# Patient Record
Sex: FEMALE | Race: OTHER | Employment: UNEMPLOYED | URBAN - NONMETROPOLITAN AREA
[De-identification: names, ages, dates, MRNs, and addresses within clinical notes are randomized per-mention and may not be internally consistent; named-entity substitution may affect disease eponyms.]

---
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
## 2025-01-10 ENCOUNTER — TELEPHONE (OUTPATIENT)
Dept: FAMILY MEDICINE CLINIC | Age: 20
End: 2025-01-10

## 2025-01-10 NOTE — TELEPHONE ENCOUNTER
Attempted to reach patient regarding missed appointment on 1/10/25. Unable to contact at this time. Unable to leave message. Letter mailed to reschedule.

## 2025-06-23 ENCOUNTER — OFFICE VISIT (OUTPATIENT)
Dept: PRIMARY CARE CLINIC | Age: 20
End: 2025-06-23
Payer: COMMERCIAL

## 2025-06-23 ENCOUNTER — HOSPITAL ENCOUNTER (OUTPATIENT)
Age: 20
Discharge: HOME | End: 2025-06-23
Payer: COMMERCIAL

## 2025-06-23 ENCOUNTER — RESULTS FOLLOW-UP (OUTPATIENT)
Dept: PRIMARY CARE CLINIC | Age: 20
End: 2025-06-23

## 2025-06-23 VITALS
OXYGEN SATURATION: 98 % | SYSTOLIC BLOOD PRESSURE: 90 MMHG | HEART RATE: 78 BPM | BODY MASS INDEX: 21 KG/M2 | WEIGHT: 126.2 LBS | DIASTOLIC BLOOD PRESSURE: 70 MMHG | TEMPERATURE: 98 F

## 2025-06-23 DIAGNOSIS — N89.8 VAGINAL DISCHARGE: ICD-10-CM

## 2025-06-23 DIAGNOSIS — N76.0 BACTERIAL VAGINOSIS: Primary | ICD-10-CM

## 2025-06-23 DIAGNOSIS — R35.0 FREQUENT URINATION: ICD-10-CM

## 2025-06-23 DIAGNOSIS — B96.89 BACTERIAL VAGINOSIS: Primary | ICD-10-CM

## 2025-06-23 LAB
BILIRUB UR QL STRIP: NEGATIVE
CANDIDA SPECIES: NEGATIVE
CLARITY UR: CLEAR
COLOR UR: YELLOW
COMMENT: ABNORMAL
GARDNERELLA VAGINALIS: POSITIVE
GLUCOSE UR STRIP-MCNC: NEGATIVE MG/DL
HCG UR QL: NEGATIVE
HGB UR QL STRIP.AUTO: NEGATIVE
KETONES UR STRIP-MCNC: NEGATIVE MG/DL
LEUKOCYTE ESTERASE UR QL STRIP: NEGATIVE
NITRITE UR QL STRIP: NEGATIVE
PH UR STRIP: 6.5 [PH] (ref 5–6)
PROT UR STRIP-MCNC: NEGATIVE MG/DL
SOURCE: ABNORMAL
SP GR UR STRIP: 1.02 (ref 1.01–1.02)
TRICHOMONAS: NEGATIVE
UROBILINOGEN UR STRIP-ACNC: NORMAL EU/DL (ref 0–1)

## 2025-06-23 PROCEDURE — 87480 CANDIDA DNA DIR PROBE: CPT

## 2025-06-23 PROCEDURE — 81003 URINALYSIS AUTO W/O SCOPE: CPT

## 2025-06-23 PROCEDURE — G8420 CALC BMI NORM PARAMETERS: HCPCS

## 2025-06-23 PROCEDURE — 81025 URINE PREGNANCY TEST: CPT

## 2025-06-23 PROCEDURE — 99213 OFFICE O/P EST LOW 20 MIN: CPT

## 2025-06-23 PROCEDURE — 1036F TOBACCO NON-USER: CPT

## 2025-06-23 PROCEDURE — 87660 TRICHOMONAS VAGIN DIR PROBE: CPT

## 2025-06-23 PROCEDURE — 87510 GARDNER VAG DNA DIR PROBE: CPT

## 2025-06-23 PROCEDURE — G8427 DOCREV CUR MEDS BY ELIG CLIN: HCPCS

## 2025-06-23 PROCEDURE — 99212 OFFICE O/P EST SF 10 MIN: CPT

## 2025-06-23 RX ORDER — METRONIDAZOLE 500 MG/1
500 TABLET ORAL 2 TIMES DAILY
Qty: 14 TABLET | Refills: 0 | Status: SHIPPED | OUTPATIENT
Start: 2025-06-23

## 2025-06-23 ASSESSMENT — ENCOUNTER SYMPTOMS
COLOR CHANGE: 0
SHORTNESS OF BREATH: 0
NAUSEA: 1
VOMITING: 0
DIARRHEA: 0
ABDOMINAL PAIN: 0
CONSTIPATION: 0
COUGH: 0

## 2025-06-23 NOTE — PATIENT INSTRUCTIONS
Will call with vaginitis probe results  Push fluids  Tylenol/Ibuprofen as needed for pain  Follow up if symptoms worsen or do not improve

## 2025-06-23 NOTE — PROGRESS NOTES
[facility]  [address] [facility] [address]  Phone: [phone]  Fax: [phone]      Kymberly Dia  2005  MRN: [id number]  Date of visit: 6/23/2025    Chief Complaint:     Kymberly Dia is here for c/o of Frequent/Recurrent UTI      HPI:     Kymberly Dia is a 19 y.o. female who presents to the [facility] today for her medical conditions/complaints as noted below.    History of Present Illness  The patient is a 19-year-old female who presents today due to UTI symptoms.    She reports that approximately 4 to 5 days ago, she noticed a significant darkening of her urine while at work. 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 She has been consuming more water and cranberry juice, which has resulted in a slight lightening of her urine over the past few days. However, she describes an unusual odor to her urine, likening it to the smell of an infection. She does not experience any burning sensation during urination but reports discomfort for about 15 minutes post-urination. She recalls a particularly pelvic painful episode at work last night, where she felt an urgent need to urinate, a sensation that continues to persist. She also reports the presence of white discharge but does not experience any itching. She is sexually active and is not currently using birth control. She reports nausea and stomach pain located below her belly button. She recently tested negative for STIs. She does not experience any back pain, fevers, chills, diarrhea, or constipation. She also reports frequent urination.    Past Medical History:   Diagnosis Date    Anxiety     Depression         No Known Allergies      Subjective:      Review of Systems   Constitutional:  Negative for appetite change, chills, diaphoresis, fatigue and fever.   HENT:  Negative for congestion.

## 2025-08-14 ENCOUNTER — HOSPITAL ENCOUNTER (EMERGENCY)
Age: 20
Discharge: HOME | End: 2025-08-14
Attending: EMERGENCY MEDICINE
Payer: COMMERCIAL

## 2025-08-14 ENCOUNTER — APPOINTMENT (OUTPATIENT)
Dept: GENERAL RADIOLOGY | Age: 20
End: 2025-08-14
Payer: COMMERCIAL

## 2025-08-14 VITALS
RESPIRATION RATE: 16 BRPM | TEMPERATURE: 98.3 F | WEIGHT: 123 LBS | SYSTOLIC BLOOD PRESSURE: 146 MMHG | HEART RATE: 94 BPM | DIASTOLIC BLOOD PRESSURE: 86 MMHG | HEIGHT: 65 IN | OXYGEN SATURATION: 100 % | BODY MASS INDEX: 20.49 KG/M2

## 2025-08-14 DIAGNOSIS — M79.621 AXILLARY TENDERNESS, RIGHT: Primary | ICD-10-CM

## 2025-08-14 DIAGNOSIS — S46.911A MUSCLE STRAIN OF RIGHT SHOULDER REGION, INITIAL ENCOUNTER: ICD-10-CM

## 2025-08-14 LAB — HCG UR QL: NEGATIVE

## 2025-08-14 PROCEDURE — 6360000002 HC RX W HCPCS: Performed by: EMERGENCY MEDICINE

## 2025-08-14 PROCEDURE — 99284 EMERGENCY DEPT VISIT MOD MDM: CPT

## 2025-08-14 PROCEDURE — 73030 X-RAY EXAM OF SHOULDER: CPT

## 2025-08-14 PROCEDURE — 96372 THER/PROPH/DIAG INJ SC/IM: CPT

## 2025-08-14 PROCEDURE — 81025 URINE PREGNANCY TEST: CPT

## 2025-08-14 RX ORDER — KETOROLAC TROMETHAMINE 30 MG/ML
30 INJECTION, SOLUTION INTRAMUSCULAR; INTRAVENOUS ONCE
Status: COMPLETED | OUTPATIENT
Start: 2025-08-14 | End: 2025-08-14

## 2025-08-14 RX ADMIN — KETOROLAC TROMETHAMINE 30 MG: 30 INJECTION, SOLUTION INTRAMUSCULAR at 07:53

## 2025-08-14 ASSESSMENT — PAIN - FUNCTIONAL ASSESSMENT: PAIN_FUNCTIONAL_ASSESSMENT: 0-10

## 2025-08-14 ASSESSMENT — PAIN DESCRIPTION - LOCATION: LOCATION: SHOULDER

## 2025-08-14 ASSESSMENT — PAIN SCALES - GENERAL: PAINLEVEL_OUTOF10: 8
